# Patient Record
Sex: FEMALE | Race: OTHER | HISPANIC OR LATINO | URBAN - METROPOLITAN AREA
[De-identification: names, ages, dates, MRNs, and addresses within clinical notes are randomized per-mention and may not be internally consistent; named-entity substitution may affect disease eponyms.]

---

## 2019-01-11 ENCOUNTER — EMERGENCY (EMERGENCY)
Facility: HOSPITAL | Age: 34
LOS: 1 days | Discharge: HOME | End: 2019-01-11
Attending: EMERGENCY MEDICINE | Admitting: EMERGENCY MEDICINE
Payer: COMMERCIAL

## 2019-01-11 VITALS
OXYGEN SATURATION: 100 % | SYSTOLIC BLOOD PRESSURE: 109 MMHG | DIASTOLIC BLOOD PRESSURE: 74 MMHG | RESPIRATION RATE: 16 BRPM | HEART RATE: 75 BPM | TEMPERATURE: 98 F

## 2019-01-11 PROCEDURE — 99220: CPT

## 2019-01-11 RX ORDER — DEXAMETHASONE 0.5 MG/5ML
10 ELIXIR ORAL ONCE
Qty: 0 | Refills: 0 | Status: COMPLETED | OUTPATIENT
Start: 2019-01-11 | End: 2019-01-11

## 2019-01-11 RX ORDER — KETOROLAC TROMETHAMINE 30 MG/ML
15 SYRINGE (ML) INJECTION ONCE
Qty: 0 | Refills: 0 | Status: DISCONTINUED | OUTPATIENT
Start: 2019-01-11 | End: 2019-01-11

## 2019-01-11 RX ADMIN — Medication 102 MILLIGRAM(S): at 21:45

## 2019-01-11 RX ADMIN — Medication 15 MILLIGRAM(S): at 21:45

## 2019-01-11 RX ADMIN — Medication 15 MILLIGRAM(S): at 22:00

## 2019-01-11 RX ADMIN — Medication 10 MILLIGRAM(S): at 22:15

## 2019-01-11 NOTE — ED PROVIDER NOTE - MEDICAL DECISION MAKING DETAILS
Pharyxalgitis without obvious PTA transfer to ENT. Plan for consul, steroid and pain control pharyngitis with concern for PTA transfer to ENT. Plan for consult, steroids and pain control. Already received abx.

## 2019-01-11 NOTE — ED ADULT NURSE NOTE - OBJECTIVE STATEMENT
Pt rcvd to INT as transfer for ENT c/s from [facility], pt aaox4, vitally stable, no significant PMHx. Reports hx of sore throat for past 1wk, painful swallowing, chills, seen at [facility] today and found to have PTA, transferred to [facility] for further eval.  Pt has 20g To L AC from prior hospital.  No addl labs ordered as per ED MD Mariscal, copy all results in chart from [facility], noted to have UCG negative documented in chart.  Pt medicated as ordered, ENT c/s @ bedside. Awaiting further eval and plan of care.  will CTM

## 2019-01-11 NOTE — ED PROVIDER NOTE - OBJECTIVE STATEMENT
32 y/o female with no PMHx of presents to the ED with c/o sore throat reports sx started 1 week ago and continuing sore throat, chills, and painful swallowing. Pt denies fever, cough, and abd pain. Otherwise healthy; she report hoarse voice   and she went to [facility] and they perform a ct of neck that showed a developing PTA on left narrow _____ of the airway and transfer per ENT. Lab significant: Leucocytosis. Pt was given Unasyn 32 y/o female with no PMHx of presents to the ED with c/o sore throat reports sx started 1 week ago and continuing sore throat, chills, and painful swallowing. Pt denies fever, cough, and abd pain. Otherwise healthy; she report hoarse voice   and she went to [facility] and they perform a ct of neck that showed a developing PTA on left narrowing of the airway and transfer per ENT. Lab significant: Leucocytosis. Pt was given Unasyn

## 2019-01-11 NOTE — ED PROVIDER NOTE - ENMT, MLM
No uvula deviation, no drooling, and no exudate  +Oropharyngeal left greater than right phalangeal erythema, mild uvula edema

## 2019-01-11 NOTE — ED ADULT NURSE NOTE - NSIMPLEMENTINTERV_GEN_ALL_ED
Implemented All Universal Safety Interventions:  Kennedy to call system. Call bell, personal items and telephone within reach. Instruct patient to call for assistance. Room bathroom lighting operational. Non-slip footwear when patient is off stretcher. Physically safe environment: no spills, clutter or unnecessary equipment. Stretcher in lowest position, wheels locked, appropriate side rails in place.

## 2019-01-11 NOTE — ED ADULT NURSE REASSESSMENT NOTE - NS ED NURSE REASSESS COMMENT FT1
Pt states unable to tolerate PO at all, advised that if unable to tolerate PO will have to stay in hospital for further management, likely to come to CDU.  ED MD Mariscal made aware to pt status, will reassess and dispo.  Will CTM.

## 2019-01-11 NOTE — ED ADULT NURSE NOTE - CHIEF COMPLAINT QUOTE
pt comes to ED from [facility] for ENt transfer for abscess. pt got tordol and abx pt has 20 g L AC

## 2019-01-11 NOTE — CONSULT NOTE ADULT - SUBJECTIVE AND OBJECTIVE BOX
HPI: 33F presents with throat pain x 1 week. Pt initially presented to an outside ED and was transferred for further management. Pt has not been on any antibiotics at home. Tolerating minimal po at home secondary to pain. Denies difficulty breathing. Denies otalgia.     T(C): 36.8 (01-11-19 @ 21:49), Max: 36.8 (01-11-19 @ 19:01)  HR: 80 (01-11-19 @ 21:49) (75 - 80)  BP: 112/75 (01-11-19 @ 21:49) (109/74 - 112/75)  RR: 16 (01-11-19 @ 21:49) (16 - 16)  SpO2: 99% (01-11-19 @ 21:49) (99% - 100%)    NAD, awake, alert  Breathing comfortably on RA   No stridor   Voice quality mildly muffled  NC clear  OC/OP 3+ tonsils with erythema, uvula midline, soft palate on left mildly more full and erythematous than right   Neck soft    Attempted needle aspiration   L soft palate injected with 1% lidocaine with 1:100,000 epinephrine. 18 guage needle was used to aspirate x3 with no return of purulence. There was minimal bleeding. Pt tolerated well.     Outside CT imaging reviewed   No well defined fluid collection - read as possible forming abscess     A/P: 33F with pharyngitis and likely left sided phlegmon - needle aspiration negative   -ok for dc home from ENT perspective if tolerating po   -would dc on abx - rec augmentin   -return for worsening symptoms   -discussed with Dr. Herrera

## 2019-01-11 NOTE — ED PROVIDER NOTE - NS_ ATTENDINGSCRIBEDETAILS _ED_A_ED_FT
The scribe's documentation has been prepared under my direction and personally reviewed by me in its entirety. I confirm that the note above accurately reflects all work, treatment, procedures, and medical decision making performed by me. ROYCE Mariscal MD

## 2019-01-11 NOTE — ED ADULT TRIAGE NOTE - CHIEF COMPLAINT QUOTE
pt comes to ED from [facility] for ENt transfer for abscess. pt got tordol and abx pt has 20 g L AC

## 2019-01-11 NOTE — ED ADULT NURSE NOTE - ED STAT RN HANDOFF DETAILS
Rpt given to CDU RN - pt aaox3, vitally stable, speech clear, airway patent, tolerating secretions and in no acute distress.  All copies labs/ucg in paper chart copies from [facility]. Brought to CDU, ambulating, retains own belongings, brought to CDU w/ EDT Jimmy.

## 2019-01-12 VITALS
HEART RATE: 99 BPM | SYSTOLIC BLOOD PRESSURE: 119 MMHG | DIASTOLIC BLOOD PRESSURE: 71 MMHG | RESPIRATION RATE: 17 BRPM | TEMPERATURE: 98 F | OXYGEN SATURATION: 99 %

## 2019-01-12 LAB
ALBUMIN SERPL ELPH-MCNC: 3.7 G/DL — SIGNIFICANT CHANGE UP (ref 3.3–5)
ALP SERPL-CCNC: 140 U/L — HIGH (ref 40–120)
ALT FLD-CCNC: 33 U/L — SIGNIFICANT CHANGE UP (ref 4–33)
ANION GAP SERPL CALC-SCNC: 13 MEQ/L — SIGNIFICANT CHANGE UP (ref 7–14)
ANISOCYTOSIS BLD QL: SLIGHT — SIGNIFICANT CHANGE UP
AST SERPL-CCNC: 17 U/L — SIGNIFICANT CHANGE UP (ref 4–32)
BASOPHILS # BLD AUTO: 0.04 K/UL — SIGNIFICANT CHANGE UP (ref 0–0.2)
BASOPHILS NFR BLD AUTO: 0.4 % — SIGNIFICANT CHANGE UP (ref 0–2)
BASOPHILS NFR SPEC: 0 % — SIGNIFICANT CHANGE UP (ref 0–2)
BILIRUB SERPL-MCNC: 0.9 MG/DL — SIGNIFICANT CHANGE UP (ref 0.2–1.2)
BLASTS # FLD: 0 % — SIGNIFICANT CHANGE UP (ref 0–0)
BUN SERPL-MCNC: 18 MG/DL — SIGNIFICANT CHANGE UP (ref 7–23)
CALCIUM SERPL-MCNC: 9.2 MG/DL — SIGNIFICANT CHANGE UP (ref 8.4–10.5)
CHLORIDE SERPL-SCNC: 102 MMOL/L — SIGNIFICANT CHANGE UP (ref 98–107)
CO2 SERPL-SCNC: 22 MMOL/L — SIGNIFICANT CHANGE UP (ref 22–31)
CREAT SERPL-MCNC: 0.65 MG/DL — SIGNIFICANT CHANGE UP (ref 0.5–1.3)
EOSINOPHIL # BLD AUTO: 0 K/UL — SIGNIFICANT CHANGE UP (ref 0–0.5)
EOSINOPHIL NFR BLD AUTO: 0 % — SIGNIFICANT CHANGE UP (ref 0–6)
EOSINOPHIL NFR FLD: 0 % — SIGNIFICANT CHANGE UP (ref 0–6)
GIANT PLATELETS BLD QL SMEAR: PRESENT — SIGNIFICANT CHANGE UP
GLUCOSE SERPL-MCNC: 112 MG/DL — HIGH (ref 70–99)
HBA1C BLD-MCNC: 5.4 % — SIGNIFICANT CHANGE UP (ref 4–5.6)
HCT VFR BLD CALC: 38.9 % — SIGNIFICANT CHANGE UP (ref 34.5–45)
HGB BLD-MCNC: 12.4 G/DL — SIGNIFICANT CHANGE UP (ref 11.5–15.5)
IMM GRANULOCYTES NFR BLD AUTO: 1.5 % — SIGNIFICANT CHANGE UP (ref 0–1.5)
LYMPHOCYTES # BLD AUTO: 1.9 K/UL — SIGNIFICANT CHANGE UP (ref 1–3.3)
LYMPHOCYTES # BLD AUTO: 17.4 % — SIGNIFICANT CHANGE UP (ref 13–44)
LYMPHOCYTES NFR SPEC AUTO: 18.2 % — SIGNIFICANT CHANGE UP (ref 13–44)
MCHC RBC-ENTMCNC: 27.2 PG — SIGNIFICANT CHANGE UP (ref 27–34)
MCHC RBC-ENTMCNC: 31.9 % — LOW (ref 32–36)
MCV RBC AUTO: 85.3 FL — SIGNIFICANT CHANGE UP (ref 80–100)
METAMYELOCYTES # FLD: 0 % — SIGNIFICANT CHANGE UP (ref 0–1)
MICROCYTES BLD QL: SLIGHT — SIGNIFICANT CHANGE UP
MONOCYTES # BLD AUTO: 0.11 K/UL — SIGNIFICANT CHANGE UP (ref 0–0.9)
MONOCYTES NFR BLD AUTO: 1 % — LOW (ref 2–14)
MONOCYTES NFR BLD: 0.9 % — LOW (ref 2–9)
MYELOCYTES NFR BLD: 0 % — SIGNIFICANT CHANGE UP (ref 0–0)
NEUTROPHIL AB SER-ACNC: 77.4 % — HIGH (ref 43–77)
NEUTROPHILS # BLD AUTO: 8.73 K/UL — HIGH (ref 1.8–7.4)
NEUTROPHILS NFR BLD AUTO: 79.7 % — HIGH (ref 43–77)
NEUTS BAND # BLD: 0.9 % — SIGNIFICANT CHANGE UP (ref 0–6)
NRBC # FLD: 0 K/UL — LOW (ref 25–125)
OTHER - HEMATOLOGY %: 0 — SIGNIFICANT CHANGE UP
PLATELET # BLD AUTO: 397 K/UL — SIGNIFICANT CHANGE UP (ref 150–400)
PLATELET COUNT - ESTIMATE: NORMAL — SIGNIFICANT CHANGE UP
PMV BLD: 9.9 FL — SIGNIFICANT CHANGE UP (ref 7–13)
POTASSIUM SERPL-MCNC: 4.5 MMOL/L — SIGNIFICANT CHANGE UP (ref 3.5–5.3)
POTASSIUM SERPL-SCNC: 4.5 MMOL/L — SIGNIFICANT CHANGE UP (ref 3.5–5.3)
PROMYELOCYTES # FLD: 0 % — SIGNIFICANT CHANGE UP (ref 0–0)
PROT SERPL-MCNC: 8.1 G/DL — SIGNIFICANT CHANGE UP (ref 6–8.3)
RBC # BLD: 4.56 M/UL — SIGNIFICANT CHANGE UP (ref 3.8–5.2)
RBC # FLD: 12.4 % — SIGNIFICANT CHANGE UP (ref 10.3–14.5)
REVIEW TO FOLLOW: YES — SIGNIFICANT CHANGE UP
SODIUM SERPL-SCNC: 137 MMOL/L — SIGNIFICANT CHANGE UP (ref 135–145)
VARIANT LYMPHS # BLD: 2.6 % — SIGNIFICANT CHANGE UP
WBC # BLD: 10.94 K/UL — HIGH (ref 3.8–10.5)
WBC # FLD AUTO: 10.94 K/UL — HIGH (ref 3.8–10.5)

## 2019-01-12 PROCEDURE — 99217: CPT

## 2019-01-12 RX ORDER — AMPICILLIN SODIUM AND SULBACTAM SODIUM 250; 125 MG/ML; MG/ML
3 INJECTION, POWDER, FOR SUSPENSION INTRAMUSCULAR; INTRAVENOUS EVERY 6 HOURS
Qty: 0 | Refills: 0 | Status: DISCONTINUED | OUTPATIENT
Start: 2019-01-12 | End: 2019-01-15

## 2019-01-12 RX ORDER — AMPICILLIN SODIUM AND SULBACTAM SODIUM 250; 125 MG/ML; MG/ML
INJECTION, POWDER, FOR SUSPENSION INTRAMUSCULAR; INTRAVENOUS
Qty: 0 | Refills: 0 | Status: DISCONTINUED | OUTPATIENT
Start: 2019-01-12 | End: 2019-01-15

## 2019-01-12 RX ORDER — DEXAMETHASONE 0.5 MG/5ML
6 ELIXIR ORAL EVERY 8 HOURS
Qty: 0 | Refills: 0 | Status: DISCONTINUED | OUTPATIENT
Start: 2019-01-12 | End: 2019-01-15

## 2019-01-12 RX ORDER — IBUPROFEN 200 MG
1 TABLET ORAL
Qty: 28 | Refills: 0 | OUTPATIENT
Start: 2019-01-12 | End: 2019-01-18

## 2019-01-12 RX ORDER — AMPICILLIN SODIUM AND SULBACTAM SODIUM 250; 125 MG/ML; MG/ML
3 INJECTION, POWDER, FOR SUSPENSION INTRAMUSCULAR; INTRAVENOUS ONCE
Qty: 0 | Refills: 0 | Status: COMPLETED | OUTPATIENT
Start: 2019-01-12 | End: 2019-01-12

## 2019-01-12 RX ADMIN — AMPICILLIN SODIUM AND SULBACTAM SODIUM 200 GRAM(S): 250; 125 INJECTION, POWDER, FOR SUSPENSION INTRAMUSCULAR; INTRAVENOUS at 00:41

## 2019-01-12 RX ADMIN — Medication 6 MILLIGRAM(S): at 05:32

## 2019-01-12 RX ADMIN — AMPICILLIN SODIUM AND SULBACTAM SODIUM 200 GRAM(S): 250; 125 INJECTION, POWDER, FOR SUSPENSION INTRAMUSCULAR; INTRAVENOUS at 05:35

## 2019-01-12 NOTE — ED CDU PROVIDER SUBSEQUENT DAY NOTE - HISTORY
33 year old female, no PMHx presents to the ED for worsening sore throat and possible PTA. Patient states she has had a sore throat, L >R worsening for one week. She went to [facility] last Saturday and was discharged without medications. On Wednesday patient went to [place] with worsening complaints, also dc without medication. Today, she went to [facility] bc she states she felt like her voice was hoarse and they did a CT that showed a possible developing L PTA and was transferred to [facility] for further management. Upon arrival in the ED, ENT examined pt, attempted needle aspiration without success. Pt given steroids in the ED. Pt brought to cdu for am labs, abx, decadron and obs. Pt managing airway and secretions appropriately.    CDU OREN Kearney: Agree with above, 32 Y/O F sent to ED for PTA after noting sore throat for one week.

## 2019-01-12 NOTE — ED CDU PROVIDER DISPOSITION NOTE - NSFOLLOWUPINSTRUCTIONS_ED_ALL_ED_FT
Rest, drink plenty of fluids and take the motrin as needed for fever and inflammation and take the antibiotics as prescribed. You can  call for an appointment with the ENT clinic at .  Advance activity as tolerated.  Continue all previously prescribed medications as directed.  Follow up with your primary care physician in 48-72 hours- bring copies of your results.  Return to the ER for worsening or persistent symptoms, and/or ANY NEW OR CONCERNING SYMPTOMS. If you have issues obtaining follow up, please call: [phone] to obtain a doctor or specialist who takes your insurance in your area.  You may call [phone] to make an appointment with the internal medicine clinic.

## 2019-01-12 NOTE — ED CDU PROVIDER INITIAL DAY NOTE - ATTENDING CONTRIBUTION TO CARE
32 y/o female with no PMHx of presents to the ED with c/o sore throat reports sx started 1 week ago and continuing sore throat, chills, and painful swallowing. Pt denies fever, cough, and abd pain. At OSH ct of neck that showed a developing PTA on left narrowing of the airway and transfer per ENT. Lab significant: Leucocytosis. Pt was given Unasyn prior to arrival. In ED given decadron, seen by ENT without evidence of airway narrowing. On exam well appearing, tolerating secretions, OP erythema, no uvular deviation, mmm, lungs clear, rrr, abd soft, no rash. Plan for CDU for IVF and steroids.

## 2019-01-12 NOTE — ED CDU PROVIDER SUBSEQUENT DAY NOTE - HISTORY
33 year old female, no PMHx presents to the ED for worsening sore throat and possible PTA. Patient states she has had a sore throat, L >R worsening for one week. She went to [facility] last Saturday and was discharged without medications. On Wednesday patient went to [place] with worsening complaints, also dc without medication. Today, she went to [facility] bc she states she felt like her voice was hoarse and they did a CT that showed a possible developing L PTA and was transferred to [facility] for further management. Upon arrival in the ED, ENT examined pt, attempted needle aspiration without success. Pt given steroids in the ED. Pt brought to cdu for am labs, abx, decadron and obs. Pt managing airway and secretions appropriately.    CDU OREN Kearney: Agree with above, 32 Y/O F no PMH C/O worsening sore throat for 1 week transferred to [facility] for possible PTA. Drainage was attempted by ENT, no drainage was expressed. Pt was reassessed by ENT overnight, recommended D/C on clinda for 7 days and states she can F/U with ENT clinic. Pt reports feeling better, pt has been tolerating PO, currently afebrile, no other acute symptoms or complaints.

## 2019-01-12 NOTE — ED CDU PROVIDER SUBSEQUENT DAY NOTE - MEDICAL DECISION MAKING DETAILS
Pt to CDU for possible PTA, no expressible drainage. ENT resident called, states pt is safe for D/C on PO ABX, given clinic # if reassessment needed.

## 2019-01-12 NOTE — ED CDU PROVIDER SUBSEQUENT DAY NOTE - ATTENDING CONTRIBUTION TO CARE
Pt with small PTA, seen by ENT who drained the abscess,, pt was given iv abx, she is feeling much better, tolerating po, well appearing, no tongue elevation, drooling, ENT recommended dc with po meds to follow up as outpatient. Precautions reviewed.

## 2019-01-12 NOTE — ED CDU PROVIDER INITIAL DAY NOTE - OBJECTIVE STATEMENT
32 y/o female with no PMHx of presents to the ED with c/o sore throat reports sx started 1 week ago and continuing sore throat, chills, and painful swallowing. Pt denies fever, cough, and abd pain. Otherwise healthy; she report hoarse voice   and she went to [facility] and they perform a ct of neck that showed a developing PTA on left narrowing of the airway and transfer per ENT. Lab significant: Leucocytosis. Pt was given Unasyn    CDU PA Baseil: 33 year old female, no PMHx presents to the ED for worsening sore throat and possible PTA. Patient states she has had a sore throat, L >R worsening for one week. She went to [facility] last Saturday and was discharged without medications. On Wednesday patient went to [place] with worsening complaints, also dc without medication. Today, she went to [facility] bc she states she felt like her voice was hoarse and they did a CT that showed a possible developing L PTA and was transferred to [facility] for further management. Upon arrival in the ED, ENT examined pt, attempted needle aspiration without success. Pt given steroids in the ED. Pt brought to cdu for am labs, abx, decadron and obs. Pt managing airway and secretions appropriately.

## 2019-01-12 NOTE — PROGRESS NOTE ADULT - SUBJECTIVE AND OBJECTIVE BOX
Pt seen and examined. Feels much better today.    vss  awake, alert  breathing comfortably   face symetric  soft palate symetric with some bruising from I&D attempts.    A/P pharyngitis  - discharge home  - clinda x 7 days  - follow up in ENT clinic as needed [phone]

## 2019-01-12 NOTE — ED CDU PROVIDER SUBSEQUENT DAY NOTE - PHYSICAL EXAMINATION
HEENT: Head NC/AT. Nares: Patent. Mouth: Dentition grossly in good repair. Throat: Pharyngeal erythema, tonsillar enlargement slightly more notable on L uvula midline and rises symmetrically with phonation.

## 2019-01-12 NOTE — ED CDU PROVIDER SUBSEQUENT DAY NOTE - PROGRESS NOTE DETAILS
Pt resting comfortably, NAD. VSS. Managing airway and secretions appropriately. Will continue to monitor and observe.

## 2019-02-05 PROBLEM — Z00.00 ENCOUNTER FOR PREVENTIVE HEALTH EXAMINATION: Status: ACTIVE | Noted: 2019-02-05

## 2019-02-09 ENCOUNTER — APPOINTMENT (OUTPATIENT)
Dept: ORTHOPEDIC SURGERY | Facility: CLINIC | Age: 34
End: 2019-02-09
Payer: COMMERCIAL

## 2019-02-09 VITALS
WEIGHT: 179 LBS | BODY MASS INDEX: 36.08 KG/M2 | SYSTOLIC BLOOD PRESSURE: 106 MMHG | HEART RATE: 59 BPM | DIASTOLIC BLOOD PRESSURE: 74 MMHG | HEIGHT: 59 IN

## 2019-02-09 PROCEDURE — 72050 X-RAY EXAM NECK SPINE 4/5VWS: CPT

## 2019-02-09 PROCEDURE — 99204 OFFICE O/P NEW MOD 45 MIN: CPT

## 2019-02-09 PROCEDURE — 72110 X-RAY EXAM L-2 SPINE 4/>VWS: CPT

## 2019-02-09 RX ORDER — IBUPROFEN 800 MG/1
TABLET, FILM COATED ORAL
Refills: 0 | Status: ACTIVE | COMMUNITY

## 2019-02-09 NOTE — PHYSICAL EXAM
[Antalgic] : antalgic [UE/LE] : Sensory: Intact in bilateral upper & lower extremities [ALL] : dorsalis pedis, posterior tibial, femoral, popliteal, and radial 2+ and symmetric bilaterally [Normal RLE] : Right Lower Extremity: No scars, rashes, lesions, ulcers, skin intact [Normal LLE] : Left Lower Extremity: No scars, rashes, lesions, ulcers, skin intact [Normal DTR Reflexes] : DTR reflexes normal [Normal Touch] : sensation intact for touch [Obese] : obese [Normal] : No costovertebral angle tenderness and no spinal tenderness [Poor Appearance] : well-appearing [Acute Distress] : not in acute distress [Poor Coordination] : normal coordination [de-identified] : Palpable tenderness over the right knee especially on the medial aspect.\par Negative Fabers test. [de-identified] : Epsilateral straight leg raise is painful to the lower back radiating down the right leg.\par  [de-identified] : Lumbar spine 4 views demonstrating good alignment on AP and lateral views with a lysis of the L5 vertebrae, no instability,\par No fractures \par Cervical spine 4 views  with good alignment, no instability, no fractures

## 2019-02-09 NOTE — DISCUSSION/SUMMARY
[Medication Risks Reviewed] : Medication risks reviewed [Surgical risks reviewed] : Surgical risks reviewed [de-identified] : Prescription for MRI provided today to evaluate the lysis noted on the L5 vertebrae at the lateral view \par Prescription for physical therapy in the mean time to help alleviate the back and neck pain.
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
## 2019-02-09 NOTE — REASON FOR VISIT
[Initial Visit] : an initial visit for [Back Pain] : back pain [Neck Pain] : neck pain [Friend] : friend

## 2019-02-26 ENCOUNTER — APPOINTMENT (OUTPATIENT)
Dept: ORTHOPEDIC SURGERY | Facility: CLINIC | Age: 34
End: 2019-02-26

## 2019-04-03 ENCOUNTER — APPOINTMENT (OUTPATIENT)
Dept: ORTHOPEDIC SURGERY | Facility: CLINIC | Age: 34
End: 2019-04-03
Payer: COMMERCIAL

## 2019-04-03 VITALS
HEART RATE: 82 BPM | DIASTOLIC BLOOD PRESSURE: 72 MMHG | BODY MASS INDEX: 36.29 KG/M2 | SYSTOLIC BLOOD PRESSURE: 106 MMHG | HEIGHT: 59 IN | WEIGHT: 180 LBS

## 2019-04-03 DIAGNOSIS — S83.249A OTHER TEAR OF MEDIAL MENISCUS, CURRENT INJURY, UNSPECIFIED KNEE, INITIAL ENCOUNTER: ICD-10-CM

## 2019-04-03 PROCEDURE — 73564 X-RAY EXAM KNEE 4 OR MORE: CPT | Mod: RT

## 2019-04-03 PROCEDURE — 99213 OFFICE O/P EST LOW 20 MIN: CPT

## 2019-04-05 ENCOUNTER — OUTPATIENT (OUTPATIENT)
Dept: OUTPATIENT SERVICES | Facility: HOSPITAL | Age: 34
LOS: 1 days | End: 2019-04-05
Payer: COMMERCIAL

## 2019-04-05 VITALS
WEIGHT: 185.19 LBS | DIASTOLIC BLOOD PRESSURE: 70 MMHG | RESPIRATION RATE: 14 BRPM | TEMPERATURE: 98 F | SYSTOLIC BLOOD PRESSURE: 110 MMHG | HEIGHT: 59 IN | HEART RATE: 68 BPM

## 2019-04-05 DIAGNOSIS — Z01.818 ENCOUNTER FOR OTHER PREPROCEDURAL EXAMINATION: ICD-10-CM

## 2019-04-05 DIAGNOSIS — M25.569 PAIN IN UNSPECIFIED KNEE: ICD-10-CM

## 2019-04-05 DIAGNOSIS — S83.249A OTHER TEAR OF MEDIAL MENISCUS, CURRENT INJURY, UNSPECIFIED KNEE, INITIAL ENCOUNTER: ICD-10-CM

## 2019-04-05 LAB
HCG UR QL: NEGATIVE — SIGNIFICANT CHANGE UP
HCT VFR BLD CALC: 38.9 % — SIGNIFICANT CHANGE UP (ref 34.5–45)
HGB BLD-MCNC: 12.8 G/DL — SIGNIFICANT CHANGE UP (ref 11.5–15.5)
MCHC RBC-ENTMCNC: 28.3 PG — SIGNIFICANT CHANGE UP (ref 27–34)
MCHC RBC-ENTMCNC: 32.9 GM/DL — SIGNIFICANT CHANGE UP (ref 32–36)
MCV RBC AUTO: 85.9 FL — SIGNIFICANT CHANGE UP (ref 80–100)
NRBC # BLD: 0 /100 WBCS — SIGNIFICANT CHANGE UP (ref 0–0)
PLATELET # BLD AUTO: 279 K/UL — SIGNIFICANT CHANGE UP (ref 150–400)
RBC # BLD: 4.53 M/UL — SIGNIFICANT CHANGE UP (ref 3.8–5.2)
RBC # FLD: 12.7 % — SIGNIFICANT CHANGE UP (ref 10.3–14.5)
WBC # BLD: 8.29 K/UL — SIGNIFICANT CHANGE UP (ref 3.8–10.5)
WBC # FLD AUTO: 8.29 K/UL — SIGNIFICANT CHANGE UP (ref 3.8–10.5)

## 2019-04-05 PROCEDURE — 36415 COLL VENOUS BLD VENIPUNCTURE: CPT

## 2019-04-05 PROCEDURE — 81025 URINE PREGNANCY TEST: CPT

## 2019-04-05 PROCEDURE — 85027 COMPLETE CBC AUTOMATED: CPT

## 2019-04-05 PROCEDURE — G0463: CPT

## 2019-04-05 NOTE — H&P PST ADULT - NSICDXPROBLEM_GEN_ALL_CORE_FT
PROBLEM DIAGNOSES  Problem: Knee meniscus pain  Assessment and Plan: Right knee arthroscopy partial medial meniscectomy   Pre op instructions
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
## 2019-04-10 ENCOUNTER — TRANSCRIPTION ENCOUNTER (OUTPATIENT)
Age: 34
End: 2019-04-10

## 2019-04-11 ENCOUNTER — OUTPATIENT (OUTPATIENT)
Dept: OUTPATIENT SERVICES | Facility: HOSPITAL | Age: 34
LOS: 1 days | End: 2019-04-11
Payer: COMMERCIAL

## 2019-04-11 ENCOUNTER — RESULT REVIEW (OUTPATIENT)
Age: 34
End: 2019-04-11

## 2019-04-11 ENCOUNTER — APPOINTMENT (OUTPATIENT)
Dept: ORTHOPEDIC SURGERY | Facility: HOSPITAL | Age: 34
End: 2019-04-11

## 2019-04-11 VITALS
RESPIRATION RATE: 15 BRPM | DIASTOLIC BLOOD PRESSURE: 63 MMHG | OXYGEN SATURATION: 100 % | HEART RATE: 74 BPM | SYSTOLIC BLOOD PRESSURE: 111 MMHG

## 2019-04-11 VITALS
OXYGEN SATURATION: 99 % | HEART RATE: 85 BPM | HEIGHT: 59 IN | DIASTOLIC BLOOD PRESSURE: 58 MMHG | SYSTOLIC BLOOD PRESSURE: 110 MMHG | RESPIRATION RATE: 14 BRPM | WEIGHT: 185.41 LBS | TEMPERATURE: 98 F

## 2019-04-11 DIAGNOSIS — S83.249A OTHER TEAR OF MEDIAL MENISCUS, CURRENT INJURY, UNSPECIFIED KNEE, INITIAL ENCOUNTER: ICD-10-CM

## 2019-04-11 DIAGNOSIS — Z98.51 TUBAL LIGATION STATUS: Chronic | ICD-10-CM

## 2019-04-11 DIAGNOSIS — Z01.818 ENCOUNTER FOR OTHER PREPROCEDURAL EXAMINATION: ICD-10-CM

## 2019-04-11 PROCEDURE — 29881 ARTHRS KNE SRG MNISECTMY M/L: CPT | Mod: RT

## 2019-04-11 PROCEDURE — 97161 PT EVAL LOW COMPLEX 20 MIN: CPT

## 2019-04-11 PROCEDURE — 88304 TISSUE EXAM BY PATHOLOGIST: CPT

## 2019-04-11 PROCEDURE — 88304 TISSUE EXAM BY PATHOLOGIST: CPT | Mod: 26

## 2019-04-11 RX ORDER — CHLORHEXIDINE GLUCONATE 213 G/1000ML
1 SOLUTION TOPICAL ONCE
Qty: 0 | Refills: 0 | Status: COMPLETED | OUTPATIENT
Start: 2019-04-11 | End: 2019-04-11

## 2019-04-11 RX ORDER — OXYCODONE HYDROCHLORIDE 5 MG/1
5 TABLET ORAL ONCE
Qty: 0 | Refills: 0 | Status: DISCONTINUED | OUTPATIENT
Start: 2019-04-11 | End: 2019-04-11

## 2019-04-11 RX ORDER — CEFAZOLIN SODIUM 1 G
2000 VIAL (EA) INJECTION ONCE
Qty: 0 | Refills: 0 | Status: COMPLETED | OUTPATIENT
Start: 2019-04-11 | End: 2019-04-11

## 2019-04-11 RX ORDER — HYDROMORPHONE HYDROCHLORIDE 2 MG/ML
0.5 INJECTION INTRAMUSCULAR; INTRAVENOUS; SUBCUTANEOUS
Qty: 0 | Refills: 0 | Status: DISCONTINUED | OUTPATIENT
Start: 2019-04-11 | End: 2019-04-12

## 2019-04-11 RX ORDER — SODIUM CHLORIDE 9 MG/ML
1000 INJECTION, SOLUTION INTRAVENOUS
Qty: 0 | Refills: 0 | Status: DISCONTINUED | OUTPATIENT
Start: 2019-04-11 | End: 2019-04-12

## 2019-04-11 RX ORDER — ONDANSETRON 8 MG/1
4 TABLET, FILM COATED ORAL ONCE
Qty: 0 | Refills: 0 | Status: COMPLETED | OUTPATIENT
Start: 2019-04-11 | End: 2019-04-11

## 2019-04-11 RX ADMIN — SODIUM CHLORIDE 100 MILLILITER(S): 9 INJECTION, SOLUTION INTRAVENOUS at 15:57

## 2019-04-11 RX ADMIN — HYDROMORPHONE HYDROCHLORIDE 0.5 MILLIGRAM(S): 2 INJECTION INTRAMUSCULAR; INTRAVENOUS; SUBCUTANEOUS at 15:37

## 2019-04-11 RX ADMIN — OXYCODONE HYDROCHLORIDE 5 MILLIGRAM(S): 5 TABLET ORAL at 16:43

## 2019-04-11 RX ADMIN — HYDROMORPHONE HYDROCHLORIDE 0.5 MILLIGRAM(S): 2 INJECTION INTRAMUSCULAR; INTRAVENOUS; SUBCUTANEOUS at 16:08

## 2019-04-11 RX ADMIN — ONDANSETRON 4 MILLIGRAM(S): 8 TABLET, FILM COATED ORAL at 16:56

## 2019-04-11 RX ADMIN — HYDROMORPHONE HYDROCHLORIDE 0.5 MILLIGRAM(S): 2 INJECTION INTRAMUSCULAR; INTRAVENOUS; SUBCUTANEOUS at 15:57

## 2019-04-11 RX ADMIN — OXYCODONE HYDROCHLORIDE 5 MILLIGRAM(S): 5 TABLET ORAL at 16:23

## 2019-04-11 RX ADMIN — CHLORHEXIDINE GLUCONATE 1 APPLICATION(S): 213 SOLUTION TOPICAL at 13:27

## 2019-04-11 NOTE — BRIEF OPERATIVE NOTE - NSICDXBRIEFPOSTOP_GEN_ALL_CORE_FT
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

## 2019-04-11 NOTE — ASU DISCHARGE PLAN (ADULT/PEDIATRIC) - ASU DC SPECIAL INSTRUCTIONSFT
Refer to pamphlet for post-op instructions.   - Call your doctor if you experience:  • An increase in pain not controlled by pain medication or change in activity or  position.  • Temperature greater than 101° F.  • Redness, increased swelling or foul smelling drainage from or around the  incision.  • Numbness, tingling or a change in color or temperature of the operative extremity.  • Call your doctor immediately if you experience chest pain, shortness of breath or calf pain.   Follow all verbal and written instructions. Take medications as prescribed. DO NOT drive, operate machinery, and/or make important decisions while on prescription pain medication. DO NOT hesitate to call Doctor's office with questions or concerns.

## 2019-04-11 NOTE — ASU DISCHARGE PLAN (ADULT/PEDIATRIC) - CALL YOUR DOCTOR IF YOU HAVE ANY OF THE FOLLOWING:
Swelling that gets worse/Fever greater than (need to indicate Fahrenheit or Celsius)/Pain not relieved by Medications/Bleeding that does not stop

## 2019-04-11 NOTE — ASU DISCHARGE PLAN (ADULT/PEDIATRIC) - CARE PROVIDER_API CALL
Vicente Carrero)  Orthopaedic Surgery  [address], [address]  [place] [address]  Phone: [phone]  Fax: [phone]  Follow Up Time:

## 2019-04-11 NOTE — BRIEF OPERATIVE NOTE - NSICDXBRIEFPROCEDURE_GEN_ALL_CORE_FT
PROCEDURES:  Arthroscopic debridement of meniscus 11-Apr-2019 15:30:53 Right Miles Perez  Surgical removal of plica of knee 11-Apr-2019 15:29:59  Miles Perez

## 2019-04-15 LAB — SURGICAL PATHOLOGY STUDY: SIGNIFICANT CHANGE UP

## 2019-04-22 ENCOUNTER — APPOINTMENT (OUTPATIENT)
Dept: ORTHOPEDIC SURGERY | Facility: CLINIC | Age: 34
End: 2019-04-22
Payer: COMMERCIAL

## 2019-04-22 VITALS
SYSTOLIC BLOOD PRESSURE: 81 MMHG | HEART RATE: 90 BPM | HEIGHT: 59 IN | BODY MASS INDEX: 36.29 KG/M2 | DIASTOLIC BLOOD PRESSURE: 54 MMHG | WEIGHT: 180 LBS

## 2019-04-22 VITALS — TEMPERATURE: 99.1 F

## 2019-04-22 PROCEDURE — 99024 POSTOP FOLLOW-UP VISIT: CPT

## 2019-04-22 PROCEDURE — 73562 X-RAY EXAM OF KNEE 3: CPT | Mod: RT

## 2019-04-29 ENCOUNTER — APPOINTMENT (OUTPATIENT)
Dept: ORTHOPEDIC SURGERY | Facility: CLINIC | Age: 34
End: 2019-04-29
Payer: COMMERCIAL

## 2019-04-29 VITALS
HEART RATE: 76 BPM | DIASTOLIC BLOOD PRESSURE: 76 MMHG | BODY MASS INDEX: 36.29 KG/M2 | WEIGHT: 180 LBS | HEIGHT: 59 IN | SYSTOLIC BLOOD PRESSURE: 118 MMHG

## 2019-04-29 PROCEDURE — 99213 OFFICE O/P EST LOW 20 MIN: CPT

## 2019-05-28 ENCOUNTER — APPOINTMENT (OUTPATIENT)
Dept: ORTHOPEDIC SURGERY | Facility: CLINIC | Age: 34
End: 2019-05-28
Payer: COMMERCIAL

## 2019-05-28 VITALS — BODY MASS INDEX: 36.29 KG/M2 | HEIGHT: 59 IN | WEIGHT: 180 LBS

## 2019-05-28 DIAGNOSIS — Z98.890 OTHER SPECIFIED POSTPROCEDURAL STATES: ICD-10-CM

## 2019-05-28 PROCEDURE — 99024 POSTOP FOLLOW-UP VISIT: CPT

## 2019-07-01 ENCOUNTER — APPOINTMENT (OUTPATIENT)
Dept: ORTHOPEDIC SURGERY | Facility: CLINIC | Age: 34
End: 2019-07-01
Payer: COMMERCIAL

## 2019-07-01 VITALS — HEIGHT: 59 IN | WEIGHT: 180 LBS | BODY MASS INDEX: 36.29 KG/M2

## 2019-07-01 DIAGNOSIS — M54.5 LOW BACK PAIN: ICD-10-CM

## 2019-07-01 DIAGNOSIS — M54.2 CERVICALGIA: ICD-10-CM

## 2019-07-01 PROCEDURE — 99214 OFFICE O/P EST MOD 30 MIN: CPT | Mod: 24

## 2019-07-01 NOTE — HISTORY OF PRESENT ILLNESS
[de-identified] : The patient returns for followup and states that she did not have time to attend physical therapy due to her work schedule. The patient has been using Advil and Tylenol with minimal relief. She try doing exercises on Joao continues to have pain regarding her neck and back.  [Pain Location] : pain [C-Spine] : C-spine region [Lumbar] : lumbar region [Stable] : stable

## 2019-07-01 NOTE — PHYSICAL EXAM
[Antalgic] : antalgic [] : Motor: [Motor Strength Upper Extremities] : left (5/5) [UE/LE] : Sensory: Intact in bilateral upper & lower extremities [Obese] : obese [Normal] : Oriented to person, place, and time, insight and judgement were intact and the affect was normal

## 2019-07-01 NOTE — DISCUSSION/SUMMARY
[de-identified] : We will try to obtain MRIs of the cervical and lumbar spines and have the patient follow up with us in one to 2 weeks further evaluation and review. If the patient can then obtained and then she was referred for chiropractic care.

## 2019-07-04 ENCOUNTER — FORM ENCOUNTER (OUTPATIENT)
Age: 34
End: 2019-07-04

## 2019-07-05 ENCOUNTER — APPOINTMENT (OUTPATIENT)
Dept: MRI IMAGING | Facility: CLINIC | Age: 34
End: 2019-07-05
Payer: COMMERCIAL

## 2019-07-05 ENCOUNTER — OUTPATIENT (OUTPATIENT)
Dept: OUTPATIENT SERVICES | Facility: HOSPITAL | Age: 34
LOS: 1 days | End: 2019-07-05
Payer: COMMERCIAL

## 2019-07-05 DIAGNOSIS — Z98.51 TUBAL LIGATION STATUS: Chronic | ICD-10-CM

## 2019-07-05 DIAGNOSIS — M54.5 LOW BACK PAIN: ICD-10-CM

## 2019-07-05 DIAGNOSIS — Z00.8 ENCOUNTER FOR OTHER GENERAL EXAMINATION: ICD-10-CM

## 2019-07-05 PROCEDURE — 72148 MRI LUMBAR SPINE W/O DYE: CPT

## 2019-07-05 PROCEDURE — 72148 MRI LUMBAR SPINE W/O DYE: CPT | Mod: 26

## 2019-07-05 PROCEDURE — 72141 MRI NECK SPINE W/O DYE: CPT | Mod: 26

## 2019-07-05 PROCEDURE — 72141 MRI NECK SPINE W/O DYE: CPT

## 2019-07-31 ENCOUNTER — APPOINTMENT (OUTPATIENT)
Dept: ORTHOPEDIC SURGERY | Facility: CLINIC | Age: 34
End: 2019-07-31
Payer: COMMERCIAL

## 2019-07-31 VITALS — WEIGHT: 180 LBS | HEIGHT: 59 IN | BODY MASS INDEX: 36.29 KG/M2

## 2019-07-31 DIAGNOSIS — M51.36 OTHER INTERVERTEBRAL DISC DEGENERATION, LUMBAR REGION: ICD-10-CM

## 2019-07-31 DIAGNOSIS — M50.20 OTHER CERVICAL DISC DISPLACEMENT, UNSPECIFIED CERVICAL REGION: ICD-10-CM

## 2019-07-31 PROCEDURE — 99214 OFFICE O/P EST MOD 30 MIN: CPT

## 2019-07-31 NOTE — DISCUSSION/SUMMARY
[de-identified] : The patient is referred to our shoulder specialists for evaluation and treatment of her right shoulder as it does not seem to be coming from the neck. With respect to treatment of the neck and back she was offered physical therapy and chiropractic and shows chiropractic. She'll follow plus as needed.

## 2019-07-31 NOTE — PHYSICAL EXAM
[] : Motor: [Antalgic] : antalgic [Motor Strength Upper Extremities] : left (5/5) [UE/LE] : Sensory: Intact in bilateral upper & lower extremities [Obese] : obese [Normal] : Oriented to person, place, and time, insight and judgement were intact and the affect was normal [de-identified] : MRI lumbar spine obtained on July 5, 2019 reveals multilevel degenerative changes most marked at L3-4 and L4-5.\par \par MRI evaluation of the cervical spine obtained on July 5, 2019 reveals small central herniations at C5-6 and C6-7 without any right-sided neurologic compression.

## 2019-07-31 NOTE — HISTORY OF PRESENT ILLNESS
[de-identified] : The patient presents for review results of her recent MRI of the cervical and lumbar spine. The patient reports no complaints. She continues to have right-sided shoulder pain. [Pain Location] : pain [3] : a minimum pain level of 3/10 [Stable] : stable [5] : a maximum pain level of 5/10

## 2019-08-07 ENCOUNTER — APPOINTMENT (OUTPATIENT)
Dept: ORTHOPEDIC SURGERY | Facility: CLINIC | Age: 34
End: 2019-08-07

## 2020-11-22 NOTE — ED CLERICAL - NS ED CLERK NOTE PRE-ARRIVAL INFORMATION; ADDITIONAL PRE-ARRIVAL INFORMATION
Behavioral Health Intake is aware of consult   pt with throat pain x 3 days can't swallow fever. Diagnosed with a pharyngeal abcess to call ent when they get here given 1.5 gm unasyn at 10:15 and toradol 15 mg at the same time CT done KARISHMA HENRIQUEZ

## 2021-04-29 NOTE — ED CDU PROVIDER INITIAL DAY NOTE - DURATION
1/week(s)
Follow up with your primary care doctor   You can take 1000mg of tylenol every 6 hours as needed     You may experience concussion after an impact.  What are the symptoms of a concussion?  Symptoms that can happen minutes to hours after a concussion include:    ?Memory loss – People sometimes forget what caused their injury, as well as what happened right before and after the injury.    ?Confusion    ?Headache    ?Dizziness or trouble with balance    ?Nausea or vomiting    ?Feeling very tired    ?Acting cranky, irritable, or not like themselves    Symptoms that can happen hours to days after a concussion include:    ?Trouble walking or talking    ?Memory problems or problems paying attention    ?Trouble sleeping    ?Mood or behavior changes    ?Vision changes    ?Being bothered by noise or light    How is a concussion treated?  A concussion does not usually need treatment. Most concussions get better on their own, but it can take time. Some people's symptoms go away within minutes to hours. Other people have symptoms for weeks to months. When symptoms last a long time, doctors call it "postconcussion syndrome."    To help your recovery after a concussion, you can:    ?Rest your body – Make sure to get plenty of sleep. Avoid heavy exercise or too much physical activity if it makes you feel worse.    ?Rest your brain – Avoid doing activities that need concentration or a lot of attention if they make you feel worse. You can start doing these things again as you get better. Avoid usage of computer/electronic device as the light can worsen your symptoms.      ?Not drink alcohol while you are still having symptoms of concussion    When should I call the doctor or nurse?  If you had a concussion, or think you might have had one, call your doctor or nurse. They can tell you whether you should go to the emergency department, and how quickly you should be seen.    After a concussion, your doctor might recommend that someone stay with you for 12 to 24 hours. This person should watch for any new symptoms.    You, or the person with you, should call the doctor or nurse if:    ?You vomit more than 3 times    ?You have a severe headache, or a headache that gets worse    ?You have a seizure    ?You have trouble walking or talking    ?Your vision changes    ?You feel weak or numb in part of your body    ?You lose control over your bladder or bowels    The person with you should also call right away if they have any trouble waking you up.    When can I play sports or do my usual activities again?  Ask your doctor when you can play sports or do your usual activities again. It will depend on your injury and symptoms, as well as the type of sport you play. Do not go back to playing on the same day as your injury.    It's important to let your brain heal completely after a concussion. Getting another concussion before your brain has healed may lead to serious brain problems.    Return immediately for any new or worsening symptoms or any new concerns

## 2022-06-07 NOTE — H&P PST ADULT - AS BP NONINV METHOD
[address] paged Dr Bobbi Garibay  06/07/22 [id number] Dr Gabriel Higuera returned call      [address]  06/07/22 [phone] auscultated w/ stethoscope
